# Patient Record
Sex: FEMALE | Race: WHITE | NOT HISPANIC OR LATINO | URBAN - METROPOLITAN AREA
[De-identification: names, ages, dates, MRNs, and addresses within clinical notes are randomized per-mention and may not be internally consistent; named-entity substitution may affect disease eponyms.]

---

## 2023-02-24 ENCOUNTER — NEW PATIENT COMPREHENSIVE (OUTPATIENT)
Dept: URBAN - METROPOLITAN AREA CLINIC 37 | Facility: CLINIC | Age: 48
End: 2023-02-24

## 2023-02-24 DIAGNOSIS — H52.4: ICD-10-CM

## 2023-02-24 PROCEDURE — 92015 DETERMINE REFRACTIVE STATE: CPT

## 2023-02-24 PROCEDURE — 92004 COMPRE OPH EXAM NEW PT 1/>: CPT

## 2023-02-24 ASSESSMENT — KERATOMETRY
OD_AXISANGLE_DEGREES: 069
OD_AXISANGLE2_DEGREES: 159
OS_K2POWER_DIOPTERS: 42.25
OS_AXISANGLE2_DEGREES: 18
OS_K1POWER_DIOPTERS: 41.75
OD_K2POWER_DIOPTERS: 42.50
OD_K1POWER_DIOPTERS: 42.00
OS_AXISANGLE_DEGREES: 108

## 2023-02-24 ASSESSMENT — VISUAL ACUITY
OS_CC: J1
OD_CC: 20/20-1
OS_CC: 20/20
OD_SC: J5
OD_SC: 20/25
OS_SC: J5
OD_CC: J1-1
OS_SC: 20/20-1

## 2023-02-24 ASSESSMENT — TONOMETRY
OD_IOP_MMHG: 13
OS_IOP_MMHG: 11

## 2023-04-24 ENCOUNTER — APPOINTMENT (OUTPATIENT)
Dept: URBAN - METROPOLITAN AREA CLINIC 193 | Age: 48
Setting detail: DERMATOLOGY
End: 2023-04-26

## 2023-04-24 DIAGNOSIS — D17 BENIGN LIPOMATOUS NEOPLASM: ICD-10-CM

## 2023-04-24 PROBLEM — D17.1 BENIGN LIPOMATOUS NEOPLASM OF SKIN AND SUBCUTANEOUS TISSUE OF TRUNK: Status: ACTIVE | Noted: 2023-04-24

## 2023-04-24 PROCEDURE — OTHER ORDER ULTRASOUND: OTHER

## 2023-04-24 PROCEDURE — 99202 OFFICE O/P NEW SF 15 MIN: CPT

## 2023-04-24 PROCEDURE — OTHER COUNSELING: OTHER

## 2023-04-24 PROCEDURE — OTHER OBSERVATION: OTHER

## 2023-04-24 PROCEDURE — OTHER OBSERVATION AND MEASURE: OTHER

## 2023-04-24 ASSESSMENT — LOCATION ZONE DERM: LOCATION ZONE: TRUNK

## 2023-04-24 ASSESSMENT — LOCATION DETAILED DESCRIPTION DERM: LOCATION DETAILED: LEFT LATERAL SUPERIOR CHEST

## 2023-04-24 ASSESSMENT — LOCATION SIMPLE DESCRIPTION DERM: LOCATION SIMPLE: CHEST

## 2023-04-24 NOTE — PROCEDURE: ORDER ULTRASOUND
Lesion Location: left side chest above left breast
Provider: Ghislaine Jorge PA-C
Priority: normal
Detail Level: Simple
Ultrasound Protocol: Ultrasound of Skin Lesion
Skin Lesion Ultrasound Reason: To help determine diagnosis and course of treatment. Cyst vs Lipoma.